# Patient Record
Sex: FEMALE | Race: WHITE | NOT HISPANIC OR LATINO | Employment: FULL TIME | URBAN - METROPOLITAN AREA
[De-identification: names, ages, dates, MRNs, and addresses within clinical notes are randomized per-mention and may not be internally consistent; named-entity substitution may affect disease eponyms.]

---

## 2019-12-12 ENCOUNTER — APPOINTMENT (OUTPATIENT)
Dept: WOMENS IMAGING | Facility: HOSPITAL | Age: 49
End: 2019-12-12

## 2019-12-12 PROCEDURE — 77063 BREAST TOMOSYNTHESIS BI: CPT | Performed by: RADIOLOGY

## 2019-12-12 PROCEDURE — 77067 SCR MAMMO BI INCL CAD: CPT | Performed by: RADIOLOGY

## 2019-12-19 ENCOUNTER — APPOINTMENT (OUTPATIENT)
Dept: WOMENS IMAGING | Facility: HOSPITAL | Age: 49
End: 2019-12-19

## 2019-12-19 PROCEDURE — 76641 ULTRASOUND BREAST COMPLETE: CPT | Performed by: RADIOLOGY

## 2020-07-10 ENCOUNTER — APPOINTMENT (OUTPATIENT)
Dept: WOMENS IMAGING | Facility: HOSPITAL | Age: 50
End: 2020-07-10

## 2020-07-10 PROCEDURE — 76641 ULTRASOUND BREAST COMPLETE: CPT | Performed by: RADIOLOGY

## 2021-06-02 ENCOUNTER — APPOINTMENT (OUTPATIENT)
Dept: WOMENS IMAGING | Facility: HOSPITAL | Age: 51
End: 2021-06-02

## 2021-06-02 PROCEDURE — 76641 ULTRASOUND BREAST COMPLETE: CPT | Performed by: RADIOLOGY

## 2021-06-02 PROCEDURE — 77062 BREAST TOMOSYNTHESIS BI: CPT | Performed by: RADIOLOGY

## 2021-06-02 PROCEDURE — 77066 DX MAMMO INCL CAD BI: CPT | Performed by: RADIOLOGY

## 2021-06-02 PROCEDURE — G0279 TOMOSYNTHESIS, MAMMO: HCPCS | Performed by: RADIOLOGY

## 2021-12-03 ENCOUNTER — APPOINTMENT (OUTPATIENT)
Dept: WOMENS IMAGING | Facility: HOSPITAL | Age: 51
End: 2021-12-03

## 2021-12-03 PROCEDURE — 76642 ULTRASOUND BREAST LIMITED: CPT | Performed by: RADIOLOGY

## 2022-05-05 ENCOUNTER — APPOINTMENT (OUTPATIENT)
Dept: WOMENS IMAGING | Facility: HOSPITAL | Age: 52
End: 2022-05-05

## 2022-05-05 PROCEDURE — 77067 SCR MAMMO BI INCL CAD: CPT | Performed by: RADIOLOGY

## 2022-05-05 PROCEDURE — 77063 BREAST TOMOSYNTHESIS BI: CPT | Performed by: RADIOLOGY

## 2022-08-08 ENCOUNTER — PREP FOR SURGERY (OUTPATIENT)
Dept: OTHER | Facility: HOSPITAL | Age: 52
End: 2022-08-08

## 2022-08-08 DIAGNOSIS — Z12.11 SCREENING FOR COLON CANCER: Primary | ICD-10-CM

## 2022-08-10 PROBLEM — Z12.11 SCREENING FOR COLON CANCER: Status: ACTIVE | Noted: 2022-08-10

## 2022-08-24 RX ORDER — CETIRIZINE HYDROCHLORIDE 10 MG/1
10 TABLET ORAL DAILY
COMMUNITY

## 2022-08-25 ENCOUNTER — HOSPITAL ENCOUNTER (OUTPATIENT)
Facility: HOSPITAL | Age: 52
Setting detail: HOSPITAL OUTPATIENT SURGERY
Discharge: HOME | End: 2022-08-25
Attending: COLON & RECTAL SURGERY | Admitting: COLON & RECTAL SURGERY

## 2022-08-25 ENCOUNTER — ANESTHESIA EVENT (OUTPATIENT)
Dept: GASTROENTEROLOGY | Facility: HOSPITAL | Age: 52
End: 2022-08-25

## 2022-08-25 ENCOUNTER — ANESTHESIA (OUTPATIENT)
Dept: GASTROENTEROLOGY | Facility: HOSPITAL | Age: 52
End: 2022-08-25

## 2022-08-25 VITALS
WEIGHT: 163 LBS | HEIGHT: 65 IN | RESPIRATION RATE: 16 BRPM | OXYGEN SATURATION: 96 % | SYSTOLIC BLOOD PRESSURE: 112 MMHG | TEMPERATURE: 98 F | DIASTOLIC BLOOD PRESSURE: 82 MMHG | BODY MASS INDEX: 27.16 KG/M2 | HEART RATE: 60 BPM

## 2022-08-25 DIAGNOSIS — Z12.11 SCREENING FOR COLON CANCER: ICD-10-CM

## 2022-08-25 PROCEDURE — 88305 TISSUE EXAM BY PATHOLOGIST: CPT | Performed by: COLON & RECTAL SURGERY

## 2022-08-25 PROCEDURE — 45380 COLONOSCOPY AND BIOPSY: CPT | Performed by: COLON & RECTAL SURGERY

## 2022-08-25 PROCEDURE — 25010000002 PROPOFOL 10 MG/ML EMULSION: Performed by: ANESTHESIOLOGY

## 2022-08-25 RX ORDER — SODIUM CHLORIDE, SODIUM LACTATE, POTASSIUM CHLORIDE, CALCIUM CHLORIDE 600; 310; 30; 20 MG/100ML; MG/100ML; MG/100ML; MG/100ML
30 INJECTION, SOLUTION INTRAVENOUS CONTINUOUS PRN
Status: DISCONTINUED | OUTPATIENT
Start: 2022-08-25 | End: 2022-08-25 | Stop reason: HOSPADM

## 2022-08-25 RX ORDER — PROPOFOL 10 MG/ML
VIAL (ML) INTRAVENOUS AS NEEDED
Status: DISCONTINUED | OUTPATIENT
Start: 2022-08-25 | End: 2022-08-25 | Stop reason: SURG

## 2022-08-25 RX ORDER — SODIUM CHLORIDE 0.9 % (FLUSH) 0.9 %
10 SYRINGE (ML) INJECTION AS NEEDED
Status: DISCONTINUED | OUTPATIENT
Start: 2022-08-25 | End: 2022-08-25 | Stop reason: HOSPADM

## 2022-08-25 RX ORDER — PROPOFOL 10 MG/ML
VIAL (ML) INTRAVENOUS CONTINUOUS PRN
Status: DISCONTINUED | OUTPATIENT
Start: 2022-08-25 | End: 2022-08-25 | Stop reason: SURG

## 2022-08-25 RX ORDER — SODIUM CHLORIDE 0.9 % (FLUSH) 0.9 %
10 SYRINGE (ML) INJECTION EVERY 12 HOURS SCHEDULED
Status: DISCONTINUED | OUTPATIENT
Start: 2022-08-25 | End: 2022-08-25 | Stop reason: HOSPADM

## 2022-08-25 RX ORDER — LIDOCAINE HYDROCHLORIDE 20 MG/ML
INJECTION, SOLUTION INFILTRATION; PERINEURAL AS NEEDED
Status: DISCONTINUED | OUTPATIENT
Start: 2022-08-25 | End: 2022-08-25 | Stop reason: SURG

## 2022-08-25 RX ADMIN — LIDOCAINE HYDROCHLORIDE 60 MG: 20 INJECTION, SOLUTION INFILTRATION; PERINEURAL at 13:10

## 2022-08-25 RX ADMIN — Medication 160 MCG/KG/MIN: at 13:10

## 2022-08-25 RX ADMIN — SODIUM CHLORIDE, POTASSIUM CHLORIDE, SODIUM LACTATE AND CALCIUM CHLORIDE 30 ML/HR: 600; 310; 30; 20 INJECTION, SOLUTION INTRAVENOUS at 12:35

## 2022-08-25 RX ADMIN — PROPOFOL 120 MG: 10 INJECTION, EMULSION INTRAVENOUS at 13:10

## 2022-08-25 NOTE — H&P
Twila Elam is a 52 y.o. female  who is referred by Marilyn Roberts MD for a colonoscopy. She   has an indications: screening for colon cancer.     She denies any change in bowel function, melena, or hematochezia.    Past Medical History:   Diagnosis Date   • GERD (gastroesophageal reflux disease)    • Seasonal allergies    • Shingles 2004       Past Surgical History:   Procedure Laterality Date   • APPENDECTOMY  2017   • BREAST LUMPECTOMY Right 2004       Medications Prior to Admission   Medication Sig Dispense Refill Last Dose   • cetirizine (zyrTEC) 10 MG tablet Take 10 mg by mouth Daily.   8/24/2022 at Unknown time       No Known Allergies    Family History   Problem Relation Age of Onset   • Stroke Paternal Grandfather    • Malig Hyperthermia Neg Hx        Social History     Socioeconomic History   • Marital status:    Tobacco Use   • Smoking status: Never Smoker   • Smokeless tobacco: Never Used   Vaping Use   • Vaping Use: Never used   Substance and Sexual Activity   • Alcohol use: No   • Drug use: Defer   • Sexual activity: Defer       Review of Systems   Gastrointestinal: Negative for abdominal pain, nausea and vomiting.   All other systems reviewed and are negative.      Vitals:    08/25/22 1223   BP: 119/84   Pulse: 71   Resp: 18   Temp: 98 °F (36.7 °C)   SpO2: 96%         Physical Exam  Constitutional:       Appearance: She is well-developed.   HENT:      Head: Normocephalic and atraumatic.   Eyes:      Pupils: Pupils are equal, round, and reactive to light.   Cardiovascular:      Rate and Rhythm: Regular rhythm.   Pulmonary:      Effort: Pulmonary effort is normal.   Abdominal:      General: There is no distension.      Palpations: Abdomen is soft.   Musculoskeletal:         General: Normal range of motion.   Skin:     General: Skin is warm and dry.   Neurological:      Mental Status: She is alert and oriented to person, place, and time.   Psychiatric:         Thought Content: Thought  content normal.         Judgment: Judgment normal.           Assessment & Plan      indications: screening for colon cancer         I recommend colonoscopy.  I described risks, benefits of the procedure with the patient including but not limited to bleeding, infection, possibility of perforation and possible polypectomy. All of the patient's questions were answered and they would like to proceed with the above recommendations.

## 2022-08-25 NOTE — DISCHARGE INSTRUCTIONS

## 2022-08-25 NOTE — ANESTHESIA PREPROCEDURE EVALUATION
Anesthesia Evaluation     Patient summary reviewed and Nursing notes reviewed   no history of anesthetic complications:  NPO Solid Status: > 6 hours  NPO Liquid Status: > 6 hours           Airway   Mallampati: II  TM distance: >3 FB  Neck ROM: full  no difficulty expected and No difficulty expected  Dental - normal exam     Pulmonary - negative pulmonary ROS and normal exam    breath sounds clear to auscultation  (-) rhonchi, decreased breath sounds, wheezes, rales, stridor  Cardiovascular - negative cardio ROS and normal exam    NYHA Classification: I  Rhythm: regular  Rate: normal    (-) murmur, weak pulses, friction rub, systolic click, carotid bruits, JVD, peripheral edema      Neuro/Psych- negative ROS  GI/Hepatic/Renal/Endo    (+)  GERD,      Musculoskeletal (-) negative ROS    Abdominal  - normal exam    Abdomen: soft.   Substance History - negative use     OB/GYN negative ob/gyn ROS         Other - negative ROS                       Anesthesia Plan    ASA 1     MAC     intravenous induction     Anesthetic plan, risks, benefits, and alternatives have been provided, discussed and informed consent has been obtained with: patient.        CODE STATUS:

## 2022-08-25 NOTE — ANESTHESIA POSTPROCEDURE EVALUATION
"Patient: Twila Elam    Procedure Summary     Date: 08/25/22 Room / Location:  [facility]    Anesthesia Start: 1256 Anesthesia Stop: 1328    Procedure: COLONOSCOPY into cecum with polypectomy (N/A ) Diagnosis:       Screening for colon cancer      (Screening for colon cancer [Z12.11])    Surgeons: Marilyn Roberts MD Provider: Austin Robles MD    Anesthesia Type: MAC ASA Status: 1          Anesthesia Type: MAC    Vitals  No vitals data found for the desired time range.          Post Anesthesia Care and Evaluation    Patient location during evaluation: bedside  Patient participation: complete - patient participated  Level of consciousness: sleepy but conscious  Pain management: adequate    Airway patency: patent  Anesthetic complications: No anesthetic complications    Cardiovascular status: acceptable  Respiratory status: acceptable  Hydration status: acceptable    Comments: */84 (BP Location: Left arm, Patient Position: Sitting)   Pulse 71   Temp 36.7 °C (98 °F) (Oral)   Resp 18   Ht 165.1 cm (65\")   Wt 73.9 kg (163 lb)   LMP 08/27/2017   SpO2 96%   BMI 27.12 kg/m²         "

## 2022-08-26 LAB
LAB AP CASE REPORT: NORMAL
LAB AP CLINICAL INFORMATION: NORMAL
PATH REPORT.FINAL DX SPEC: NORMAL
PATH REPORT.GROSS SPEC: NORMAL

## 2024-01-26 ENCOUNTER — OFFICE VISIT (OUTPATIENT)
Dept: FAMILY MEDICINE CLINIC | Facility: CLINIC | Age: 54
End: 2024-01-26
Payer: COMMERCIAL

## 2024-01-26 VITALS
BODY MASS INDEX: 28.32 KG/M2 | SYSTOLIC BLOOD PRESSURE: 128 MMHG | HEART RATE: 83 BPM | HEIGHT: 65 IN | WEIGHT: 170 LBS | DIASTOLIC BLOOD PRESSURE: 60 MMHG | OXYGEN SATURATION: 98 %

## 2024-01-26 DIAGNOSIS — L85.3 DRY SKIN: ICD-10-CM

## 2024-01-26 DIAGNOSIS — Z11.59 NEED FOR HEPATITIS C SCREENING TEST: ICD-10-CM

## 2024-01-26 DIAGNOSIS — Z23 ENCOUNTER FOR IMMUNIZATION: ICD-10-CM

## 2024-01-26 DIAGNOSIS — K21.9 GASTROESOPHAGEAL REFLUX DISEASE, UNSPECIFIED WHETHER ESOPHAGITIS PRESENT: Primary | ICD-10-CM

## 2024-01-26 DIAGNOSIS — Z00.00 ANNUAL PHYSICAL EXAM: ICD-10-CM

## 2024-01-26 PROBLEM — Z86.010 HISTORY OF COLON POLYPS: Status: ACTIVE | Noted: 2024-01-26

## 2024-01-26 PROBLEM — R92.30 DENSE BREAST TISSUE: Status: ACTIVE | Noted: 2024-01-26

## 2024-01-26 PROBLEM — Z12.11 SCREENING FOR COLON CANCER: Status: RESOLVED | Noted: 2022-08-10 | Resolved: 2024-01-26

## 2024-01-26 PROBLEM — R12 HEARTBURN: Status: ACTIVE | Noted: 2024-01-26

## 2024-01-26 PROBLEM — Z80.0 FAMILY HISTORY OF COLON CANCER: Status: ACTIVE | Noted: 2024-01-26

## 2024-01-26 PROBLEM — Z90.49 S/P LAPAROSCOPIC APPENDECTOMY: Status: ACTIVE | Noted: 2017-09-17

## 2024-01-26 PROBLEM — K35.33 ACUTE APPENDICITIS WITH PERFORATION AND PERITONEAL ABSCESS: Status: RESOLVED | Noted: 2017-09-17 | Resolved: 2024-01-26

## 2024-01-26 PROBLEM — Z86.0100 HISTORY OF COLON POLYPS: Status: ACTIVE | Noted: 2024-01-26

## 2024-01-26 RX ORDER — METHYLPREDNISOLONE 4 MG/1
TABLET ORAL
COMMUNITY
Start: 2023-12-12 | End: 2024-01-26

## 2024-01-26 RX ORDER — AMOXICILLIN AND CLAVULANATE POTASSIUM 875; 125 MG/1; MG/1
1 TABLET, FILM COATED ORAL EVERY 12 HOURS SCHEDULED
COMMUNITY
Start: 2023-12-09 | End: 2024-01-26

## 2024-01-26 RX ORDER — CLINDAMYCIN HYDROCHLORIDE 300 MG/1
1 CAPSULE ORAL 3 TIMES DAILY
COMMUNITY
Start: 2023-12-12 | End: 2024-01-26

## 2024-01-26 NOTE — PATIENT INSTRUCTIONS
Today: Update screening labs and vaccinations.    Meds: None    Referrals: None    Imaging: Mammo in May    Healthy lifestyle tips  - Reduce intake of processed food (shop perimeter of grocery store), especially white flour and sugar  - Increase intake of fruits/veggies  - Drink 32-64oz of water a day  - Quit smoking if you smoke  - Drink no more than 2 alcoholic beverages/day if you are male, no more than 1 alcoholic beverage/day if you are female  - Get 6-8 hours of restful sleep at night- poor sleep quality has been linked to increased risk of cardiovascular disease  - Voluntary physical activity cumulative 120-150 minutes/week  - Stress management utilizing meditation and mindfulness ([employer], free kenia)  - Keep blood pressure < 140/90    Heart healthy diet from AHA: https://www.heart.org/en/healthy-living/healthy-eating/eat-smart/nutrition-basics/aha-diet-and-lifestyle-recommendations    Skin care plan  - apply unscented eczema lotion 2x daily and within 5 minutes of getting out of a shower (use only lotion in a tube or jar; anything with a pump will have alcohol in it to thin the lotion). Recommended brands: Vanicream, cereve, eucerin, cetaphil  - if hands/feet becomes extremely dry/cracked recommend applying aquaphor or petroleum jelly before bed and covering hands/feet with sock while sleeping  - use scent-free/dye free bath and laundry products  - use humidifier at home and especially at night  - drink at least 32oz of water daily    HEARTBURN PLAN:  Lifestyle changes: caffeine reduction, dietary changes, elevate head of bed, do not eat after supper  Meds: OTC meds  Imaging: not indicated  Referrals: no  Red flags: trouble swallowing, unintentional weight loss, tarry/black bowel movements, blood in bowel movements, blood in vomit, abdominal/chest pain that does not go away, abdominal/chest pain worse after eating

## 2024-01-26 NOTE — PROGRESS NOTES
"Chief Complaint  Chief Complaint   Patient presents with    [facility] Care    Heartburn       Subjective    History of Present Illness  Twila Elam is a 54 y.o. female presents to [facility] to establish care.  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 Feels rested when she wake sup in the morning, she does not snore.   Mood: Pretty good- no concerns  Health goals: She just turned 54 and is working on improving her health and maintaining mobility.  She has not had a PCP for years because she generally does not get sick.   6 weeks ago she had a red spot on her chin, has had folliculitis before and thought it was that. Spot started getting bigger and there was some facial swelling. [place] [facility] [place] augmentin. Within 2 days the swelling worsened to where she could not eat and the skin was turning purple. Went to ER and the wait was 6 hours at 6pm, so went back [place] [facility] [place] put on clindamycin and a steroid and it improved almost immediately. She was seen a 3rd time just before the holiday weekend, there was a subcutaneous ropy nodule in the cheek, advised to continue watching and waiting and it improved with time.        Current Outpatient Medications on File Prior to Visit   Medication Sig Dispense Refill    cetirizine (zyrTEC) 10 MG tablet Take 1 tablet by mouth Daily.      [DISCONTINUED] " amoxicillin-clavulanate (AUGMENTIN) 875-125 MG per tablet Take 1 tablet by mouth Every 12 (Twelve) Hours. (Patient not taking: Reported on 1/26/2024)      [DISCONTINUED] clindamycin (CLEOCIN) 300 MG capsule Take 1 capsule by mouth 3 times a day. (Patient not taking: Reported on 1/26/2024)      [DISCONTINUED] methylPREDNISolone (MEDROL) 4 MG dose pack take by mouth as directed on inside of package (Patient not taking: Reported on 1/26/2024)       No current facility-administered medications on file prior to visit.       Patient Active Problem List   Diagnosis    S/P laparoscopic appendectomy    Family history of colon cancer    History of colon polyps    Dense breast tissue    GERD (gastroesophageal reflux disease)    Dry skin       Past Medical History:   Diagnosis Date    Acute appendicitis with perforation and peritoneal abscess 09/17/2017    Colon polyps     FOLLOWED BY DR. GRICELDA BROWN    GERD (gastroesophageal reflux disease)     Headache Lifelong    Mostly weather related    Screening for colon cancer     Added automatically from request for surgery [id number]    Seasonal allergies     Shingles 2004       Past Surgical History:   Procedure Laterality Date    APPENDECTOMY  2017    BREAST LUMPECTOMY Right 2004    COLONOSCOPY N/A 08/25/2022    4 MM BENIGN POLYP IN RECTUM, RESCOPE IN 5 YRS, DR. GRICELDA BROWN AT [facility]       Family History   Problem Relation Age of Onset    Colon cancer Mother         Early colon cancer 2021    Thyroid disease Mother         Hypothyroidism    Hyperlipidemia Mother     Other Father         Diagnosed with MS (and possibly MD) around 2010; passed from covid 2021    Thyroid cancer Daughter 13    Hypothyroidism Maternal Aunt     Stroke Paternal Grandfather     Malig Hyperthermia Neg Hx        Health Maintenance Summary            Ordered - HEPATITIS C SCREENING (Once) Ordered on 1/26/2024      No completion, postpone, or frequency change history exists for this topic.              Postponed -  "BMI FOLLOWUP (Yearly) Postponed until 1/27/2024 01/26/2024  Postponed until 1/27/2024 by Theresa Parra MD (Pending event)              Postponed - MAMMOGRAM (Every 2 Years) Postponed until 5/1/2024 01/26/2024  Postponed until 5/1/2024 by Theresa Parra MD (Pending event - scheduled)              Postponed - INFLUENZA VACCINE (Yearly - August to March) Postponed until 12/27/2024 01/26/2024  Postponed until 12/27/2024 by Theresa Parra MD (Patient Refused)              Postponed - ZOSTER VACCINE (1 of 2) Postponed until 1/26/2025 01/26/2024  Postponed until 1/26/2025 by Theresa Parra MD (Pending event)              ANNUAL PHYSICAL (Yearly) Next due on 1/26/2025 01/26/2024  Done              PAP SMEAR (Every 3 Years) Next due on 5/1/2026 05/01/2023  Patient-Reported (Performed Externally)              COLORECTAL CANCER SCREENING (COLONOSCOPY - Every 10 Years) Next due on 8/25/2032 08/25/2022  COLONOSCOPY    08/25/2022  Surgical Procedure: COLONOSCOPY              TDAP/TD VACCINES (2 - Td or Tdap) Next due on 1/26/2034 01/26/2024  Imm Admin: Tdap              COVID-19 Vaccine (Series Information) Completed      10/31/2023  Imm Admin: COVID-19 F23 (MODERNA) 12YRS+ (SPIKEVAX)    01/06/2023  Imm Admin: COVID-19 (PFIZER) BIVALENT 12+YRS    06/03/2022  Imm Admin: COVID-19 (MODERNA) Monovalent Original Booster    11/19/2021  Imm Admin: COVID-19 (MODERNA) 1st,2nd,3rd Dose Monovalent    04/28/2021  Imm Admin: COVID-19 (MODERNA) 1st,2nd,3rd Dose Monovalent    Only the first 5 history entries have been loaded, but more history exists.              Pneumococcal Vaccine 0-64 (Series Information) Aged Out      No completion, postpone, or frequency change history exists for this topic.                       Objective   Vitals:    01/26/24 1050   BP: 128/60   Pulse: 83   SpO2: 98%   Weight: 77.1 kg (170 lb)   Height: 165.1 cm (65\")        Physical Exam  Vitals reviewed.   Constitutional:       " General: She is not in acute distress.     Appearance: Normal appearance.   HENT:      Head: Normocephalic and atraumatic.      Right Ear: Tympanic membrane, ear canal and external ear normal.      Left Ear: Tympanic membrane, ear canal and external ear normal.      Nose: Nose normal. No rhinorrhea.      Mouth/Throat:      Mouth: Mucous membranes are moist.      Pharynx: No posterior oropharyngeal erythema.   Eyes:      Extraocular Movements: Extraocular movements intact.      Conjunctiva/sclera: Conjunctivae normal.      Pupils: Pupils are equal, round, and reactive to light.   Neck:      Comments: No thyromegaly or thyroid nodule on palpation  Cardiovascular:      Rate and Rhythm: Normal rate and regular rhythm.      Pulses: Normal pulses.      Heart sounds: Normal heart sounds. No murmur heard.  Pulmonary:      Effort: Pulmonary effort is normal.      Breath sounds: Normal breath sounds. No wheezing.   Abdominal:      General: Bowel sounds are normal. There is no distension.      Palpations: Abdomen is soft.      Tenderness: There is no abdominal tenderness.   Musculoskeletal:         General: No tenderness or deformity. Normal range of motion.      Cervical back: Normal range of motion and neck supple.   Lymphadenopathy:      Cervical: No cervical adenopathy.   Skin:     General: Skin is warm and dry.      Capillary Refill: Capillary refill takes less than 2 seconds.      Findings: No lesion or rash.      Comments: Dry, cracking skin on thumb  0.5cm ovoid seborrheic keratosis on L thoracic back   Neurological:      General: No focal deficit present.      Mental Status: She is alert and oriented to person, place, and time.      Gait: Gait normal.      Deep Tendon Reflexes: Reflexes normal.   Psychiatric:         Mood and Affect: Mood normal.         Behavior: Behavior normal.         Judgment: Judgment normal.         PHQ-9 Depression Screening  Little interest or pleasure in doing things? 0-->not at all    Feeling down, depressed, or hopeless? 0-->not at all   Trouble falling or staying asleep, or sleeping too much?     Feeling tired or having little energy?     Poor appetite or overeating?     Feeling bad about yourself - or that you are a failure or have let yourself or your family down?     Trouble concentrating on things, such as reading the newspaper or watching television?     Moving or speaking so slowly that other people could have noticed? Or the opposite - being so fidgety or restless that you have been moving around a lot more than usual?     Thoughts that you would be better off dead, or of hurting yourself in some way?     PHQ-9 Total Score 0   If you checked off any problems, how difficult have these problems made it for you to do your work, take care of things at home, or get along with other people?       The following data was reviewed by: Theresa Parra MD on 01/26/2024:    Consultant notes C note x 3 and GI studies colonoscopy, biopsy result      Assessment and Plan  Twila Elam is a 54 y.o. female presents to [facility] today to establish care, chart reviewed and updated, medications reviewed, labs reviewed, preventative med reviewed. Patient has not been seen by primary care for annual exam in the last 12 months.. Answered all questions at this time, pt expressed no further concerns today.     Preventative medicine:   Eye exam: Not up to date.  Encouraged annual dental exam.   Dental exam: Up to date.    BP: normal  Lipid Panel :   ordered    A1c:  ordered    Hep C screening: ordered  HIV Screen UTD - N/A  STI screening UTD: N/A  Colon cancer screening UTD: Yes  Lung cancer screening UTD: N/A  Immunizations UTD: No- pt declined flu shot  Anxiety/depression screening: normal  Tobacco cessation counseling: N/A    Women:   Pap: Up to date.  normal  Mammogram: Up to date.  normal  Osteoporosis Screen:  age 65      Diagnoses and all orders for this visit:    1.  Gastroesophageal reflux disease, unspecified whether esophagitis present (Primary)  Assessment & Plan:  History and exam reassuring no emergent etiology likely. Diet controlled and PRN OTC meds. Shared decision making with patient.   - AVS with suggestions for GERD  - symptom relief with OTC meds  - RTC if no improvement or change in symptoms      Orders:  -     CBC & Differential  -     Comprehensive Metabolic Panel  -     Lipid Panel  -     Hemoglobin A1c    2. Dry skin  Assessment & Plan:  Dry skin with some cracking on thumb.  - apply unscented eczema lotion 2x daily and within 5 minutes of getting out of a shower (use only lotion in a tube or jar; anything with a pump will have alcohol in it to thin the lotion). Recommended brands: Vanicream, cereve, eucerin, cetaphil  - if hands/feet becomes extremely dry/cracked recommend applying aquaphor or petroleum jelly before bed and covering hands/feet with sock while sleeping  - use scent-free/dye free bath and laundry products  - use humidifier at home and especially at night  - drink at least 32oz of water daily  - if no improvement, can consider topical steroid        3. Need for hepatitis C screening test  -     Hepatitis C Antibody    4. Annual physical exam  -     CBC & Differential  -     Comprehensive Metabolic Panel  -     Lipid Panel  -     Hemoglobin A1c    5. Encounter for immunization  -     Tdap Vaccine Greater Than or Equal To 6yo IM  -     Shingrix Vaccine        Patient voiced understanding and agreement with plan of care and had no further questions or concerns at this time.     I spent 31 minutes on this encounter, including chart review of any relevant previous encounters, labs, and imaging;  >%50% of encounter spent face-to-face with the patient or coordinating care.    Theresa Parra MD  Family Medicine  [facility]    Follow Up  Return if symptoms worsen or fail to improve.    Patient Instructions   Today: Update screening labs  and vaccinations.    Meds: None    Referrals: None    Imaging: Mammo in May    Healthy lifestyle tips  - Reduce intake of processed food (shop perimeter of grocery store), especially white flour and sugar  - Increase intake of fruits/veggies  - Drink 32-64oz of water a day  - Quit smoking if you smoke  - Drink no more than 2 alcoholic beverages/day if you are male, no more than 1 alcoholic beverage/day if you are female  - Get 6-8 hours of restful sleep at night- poor sleep quality has been linked to increased risk of cardiovascular disease  - Voluntary physical activity cumulative 120-150 minutes/week  - Stress management utilizing meditation and mindfulness ([employer], free kenia)  - Keep blood pressure < 140/90    Heart healthy diet from AHA: https://www.heart.org/en/healthy-living/healthy-eating/eat-smart/nutrition-basics/aha-diet-and-lifestyle-recommendations    Skin care plan  - apply unscented eczema lotion 2x daily and within 5 minutes of getting out of a shower (use only lotion in a tube or jar; anything with a pump will have alcohol in it to thin the lotion). Recommended brands: Vanicream, cereve, eucerin, cetaphil  - if hands/feet becomes extremely dry/cracked recommend applying aquaphor or petroleum jelly before bed and covering hands/feet with sock while sleeping  - use scent-free/dye free bath and laundry products  - use humidifier at home and especially at night  - drink at least 32oz of water daily    HEARTBURN PLAN:  Lifestyle changes: caffeine reduction, dietary changes, elevate head of bed, do not eat after supper  Meds: OTC meds  Imaging: not indicated  Referrals: no  Red flags: trouble swallowing, unintentional weight loss, tarry/black bowel movements, blood in bowel movements, blood in vomit, abdominal/chest pain that does not go away, abdominal/chest pain worse after eating

## 2024-01-26 NOTE — ASSESSMENT & PLAN NOTE
History and exam reassuring no emergent etiology likely. Diet controlled and PRN OTC meds. Shared decision making with patient.   - AVS with suggestions for GERD  - symptom relief with OTC meds  - RTC if no improvement or change in symptoms

## 2024-01-26 NOTE — ASSESSMENT & PLAN NOTE
Dry skin with some cracking on thumb.  - apply unscented eczema lotion 2x daily and within 5 minutes of getting out of a shower (use only lotion in a tube or jar; anything with a pump will have alcohol in it to thin the lotion). Recommended brands: Vanicream, cereve, eucerin, cetaphil  - if hands/feet becomes extremely dry/cracked recommend applying aquaphor or petroleum jelly before bed and covering hands/feet with sock while sleeping  - use scent-free/dye free bath and laundry products  - use humidifier at home and especially at night  - drink at least 32oz of water daily  - if no improvement, can consider topical steroid

## 2024-01-27 LAB
ALBUMIN SERPL-MCNC: 4.7 G/DL (ref 3.5–5.2)
ALBUMIN/GLOB SERPL: 2 G/DL
ALP SERPL-CCNC: 70 U/L (ref 39–117)
ALT SERPL-CCNC: 42 U/L (ref 1–33)
AST SERPL-CCNC: 34 U/L (ref 1–32)
BASOPHILS # BLD AUTO: 0.05 10*3/MM3 (ref 0–0.2)
BASOPHILS NFR BLD AUTO: 1 % (ref 0–1.5)
BILIRUB SERPL-MCNC: 0.7 MG/DL (ref 0–1.2)
BUN SERPL-MCNC: 12 MG/DL (ref 6–20)
BUN/CREAT SERPL: 14.3 (ref 7–25)
CALCIUM SERPL-MCNC: 10.1 MG/DL (ref 8.6–10.5)
CHLORIDE SERPL-SCNC: 101 MMOL/L (ref 98–107)
CHOLEST SERPL-MCNC: 242 MG/DL (ref 0–200)
CO2 SERPL-SCNC: 23.4 MMOL/L (ref 22–29)
CREAT SERPL-MCNC: 0.84 MG/DL (ref 0.57–1)
EGFRCR SERPLBLD CKD-EPI 2021: 82.7 ML/MIN/1.73
EOSINOPHIL # BLD AUTO: 0.13 10*3/MM3 (ref 0–0.4)
EOSINOPHIL NFR BLD AUTO: 2.6 % (ref 0.3–6.2)
ERYTHROCYTE [DISTWIDTH] IN BLOOD BY AUTOMATED COUNT: 13.3 % (ref 12.3–15.4)
GLOBULIN SER CALC-MCNC: 2.4 GM/DL
GLUCOSE SERPL-MCNC: 89 MG/DL (ref 65–99)
HBA1C MFR BLD: 6.1 % (ref 4.8–5.6)
HCT VFR BLD AUTO: 43.8 % (ref 34–46.6)
HCV IGG SERPL QL IA: NON REACTIVE
HDLC SERPL-MCNC: 60 MG/DL (ref 40–60)
HGB BLD-MCNC: 14.4 G/DL (ref 12–15.9)
IMM GRANULOCYTES # BLD AUTO: 0.01 10*3/MM3 (ref 0–0.05)
IMM GRANULOCYTES NFR BLD AUTO: 0.2 % (ref 0–0.5)
LDLC SERPL CALC-MCNC: 156 MG/DL (ref 0–100)
LYMPHOCYTES # BLD AUTO: 1.29 10*3/MM3 (ref 0.7–3.1)
LYMPHOCYTES NFR BLD AUTO: 25.9 % (ref 19.6–45.3)
MCH RBC QN AUTO: 29.4 PG (ref 26.6–33)
MCHC RBC AUTO-ENTMCNC: 32.9 G/DL (ref 31.5–35.7)
MCV RBC AUTO: 89.6 FL (ref 79–97)
MONOCYTES # BLD AUTO: 0.41 10*3/MM3 (ref 0.1–0.9)
MONOCYTES NFR BLD AUTO: 8.2 % (ref 5–12)
NEUTROPHILS # BLD AUTO: 3.1 10*3/MM3 (ref 1.7–7)
NEUTROPHILS NFR BLD AUTO: 62.1 % (ref 42.7–76)
NRBC BLD AUTO-RTO: 0 /100 WBC (ref 0–0.2)
PLATELET # BLD AUTO: 335 10*3/MM3 (ref 140–450)
POTASSIUM SERPL-SCNC: 4.8 MMOL/L (ref 3.5–5.2)
PROT SERPL-MCNC: 7.1 G/DL (ref 6–8.5)
RBC # BLD AUTO: 4.89 10*6/MM3 (ref 3.77–5.28)
SODIUM SERPL-SCNC: 138 MMOL/L (ref 136–145)
TRIGL SERPL-MCNC: 145 MG/DL (ref 0–150)
VLDLC SERPL CALC-MCNC: 26 MG/DL (ref 5–40)
WBC # BLD AUTO: 4.99 10*3/MM3 (ref 3.4–10.8)

## 2024-01-29 DIAGNOSIS — E78.2 MODERATE MIXED HYPERLIPIDEMIA NOT REQUIRING STATIN THERAPY: Primary | ICD-10-CM

## 2024-01-29 DIAGNOSIS — R73.03 PREDIABETES: ICD-10-CM

## 2024-01-29 DIAGNOSIS — R79.89 ABNORMAL LFTS: ICD-10-CM

## 2024-01-29 NOTE — PROGRESS NOTES
Hello!    Here are the results of your most recent labs:    Your Hep C antibody, CBC, and Kidney Profile was all normal.     Your Cholesterol, Hgb A1C, and Liver Panel was abnormal.     Your cholesterol was elevated.  For diet and lifestyle intervention, I recommend decreasing intake of trans and saturated fats, and red meat.  Increase physical activity as tolerated.  You may try supplementing with omega-3 1-2g daily, berberine 500mg daily, and/or whole flaxseed if desired.    ASCVD risk: The 10-year ASCVD risk score (Lilibeth MONIQUE, et al., 2019) is: 2%    Values used to calculate the score:      Age: 54 years      Sex: Female      Is Non- : No      Diabetic: No      Tobacco smoker: No      Systolic Blood Pressure: 128 mmHg      Is BP treated: No      HDL Cholesterol: 60 mg/dL      Total Cholesterol: 242 mg/dL      At this time a statin is not recommended.     Your A1c was in the prediabetic range; if it goes over 6.5 you will have type 2 diabetes. I recommend decreasing carbohydrate intake to 150-200 g/day, reducing processed food, increasing fruit and vegetable intake, at least 120 minutes of physical activity per week as tolerated, and controlling blood pressure with a goal of less than 120/80. We can start a medication called Metformin to help control your blood sugar, let me know if you would like that ordered.     Repeat labs in 3 months, ordered for our [email or web address] facility ([email or web address] [facility], [place] [address]) by the clinic- please be fasting (no food after midnight, water/meds/black coffee ok).     Please continue your current medications.  Please contact me with any questions.    Thank you!  Dr. Aida Novoa!    Here are the results of your most recent labs:    Your Hep C antibody, CBC, and Kidney Profile was all normal.     Your Cholesterol, Hgb A1C, and Liver Panel was abnormal.     Your cholesterol was elevated.  For diet and lifestyle intervention, I recommend decreasing intake  of trans and saturated fats, and red meat.  Increase physical activity as tolerated.  You may try supplementing with omega-3 1-2g daily, berberine 500mg daily, and/or whole flaxseed if desired.    ASCVD risk: The 10-year ASCVD risk score (Lilibeth MONIQUE, et al., 2019) is: 2%    Values used to calculate the score:      Age: 54 years      Sex: Female      Is Non- : No      Diabetic: No      Tobacco smoker: No      Systolic Blood Pressure: 128 mmHg      Is BP treated: No      HDL Cholesterol: 60 mg/dL      Total Cholesterol: 242 mg/dL      At this time a statin is not recommended.     Your A1c was in the prediabetic range; if it goes over 6.5 you will have type 2 diabetes. I recommend decreasing carbohydrate intake to 150-200 g/day, reducing processed food, increasing fruit and vegetable intake, at least 120 minutes of physical activity per week as tolerated, and controlling blood pressure with a goal of less than 120/80. We can start a medication called Metformin to help control your blood sugar, let me know if you would like that ordered.     Repeat labs in 3 months, ordered for our [facility] facility ([address] [facility], [place] [address]) by the clinic- please be fasting (no food after midnight, water/meds/black coffee ok).     Please continue your current medications.  Please contact me with any questions.    Thank you!  Dr. Parra

## 2024-05-01 ENCOUNTER — PATIENT MESSAGE (OUTPATIENT)
Dept: FAMILY MEDICINE CLINIC | Facility: CLINIC | Age: 54
End: 2024-05-01
Payer: COMMERCIAL

## 2024-05-15 ENCOUNTER — TELEPHONE (OUTPATIENT)
Dept: FAMILY MEDICINE CLINIC | Facility: CLINIC | Age: 54
End: 2024-05-15
Payer: COMMERCIAL

## 2024-09-10 ENCOUNTER — LAB (OUTPATIENT)
Dept: FAMILY MEDICINE CLINIC | Facility: CLINIC | Age: 54
End: 2024-09-10
Payer: COMMERCIAL

## 2024-09-10 DIAGNOSIS — R79.89 ABNORMAL LFTS: ICD-10-CM

## 2024-09-10 DIAGNOSIS — E78.2 MODERATE MIXED HYPERLIPIDEMIA NOT REQUIRING STATIN THERAPY: ICD-10-CM

## 2024-09-10 DIAGNOSIS — R73.03 PREDIABETES: ICD-10-CM

## 2024-09-11 LAB
ALBUMIN SERPL-MCNC: 4.4 G/DL (ref 3.5–5.2)
ALP SERPL-CCNC: 74 U/L (ref 39–117)
ALT SERPL-CCNC: 30 U/L (ref 1–33)
AST SERPL-CCNC: 38 U/L (ref 1–32)
BILIRUB DIRECT SERPL-MCNC: <0.2 MG/DL (ref 0–0.3)
BILIRUB SERPL-MCNC: 0.4 MG/DL (ref 0–1.2)
CHOLEST SERPL-MCNC: 250 MG/DL (ref 0–200)
HBA1C MFR BLD: 5.7 % (ref 4.8–5.6)
HDLC SERPL-MCNC: 61 MG/DL (ref 40–60)
LDLC SERPL CALC-MCNC: 178 MG/DL (ref 0–100)
PROT SERPL-MCNC: 6.7 G/DL (ref 6–8.5)
TRIGL SERPL-MCNC: 68 MG/DL (ref 0–150)
VLDLC SERPL CALC-MCNC: 11 MG/DL (ref 5–40)

## 2024-09-12 NOTE — PROGRESS NOTES
Hello!    Here are the results of your most recent labs:    Your A1c improved-your level went from 6.1 to 5.7, great work!      Your liver function enzymes-1 improved, your ALT.  However your AST worsened, with your level going from 34 a few months ago to 38 most recently. We can discuss further evaluation at your annual exam.    Your cholesterol did not improve, it worsened since it was last checked.  For diet and lifestyle intervention, I recommend decreasing intake of trans and saturated fats, and red meat.  Increase physical activity as tolerated.  You may try supplementing with omega-3 1-2g daily, berberine 500mg daily, and/or whole flaxseed if desired.    ASCVD risk: The 10-year ASCVD risk score (Lilibeth MONIQUE, et al., 2019) is: 2.1%    Values used to calculate the score:      Age: 54 years      Sex: Female      Is Non- : No      Diabetic: No      Tobacco smoker: No      Systolic Blood Pressure: 128 mmHg      Is BP treated: No      HDL Cholesterol: 61 mg/dL      Total Cholesterol: 250 mg/dL     At this time a statin is not recommended.     You are due your mammogram and annual exam by the end of January, please schedule both.  If you already had your mammogram done, please send us documentation so we can update your chart.    Please continue your current medications.  Please contact me with any questions.    Thank you!  Dr. Parra

## 2025-04-11 ENCOUNTER — OFFICE VISIT (OUTPATIENT)
Dept: FAMILY MEDICINE CLINIC | Facility: CLINIC | Age: 55
End: 2025-04-11
Payer: COMMERCIAL

## 2025-04-11 VITALS
BODY MASS INDEX: 26.33 KG/M2 | HEART RATE: 63 BPM | OXYGEN SATURATION: 97 % | WEIGHT: 158 LBS | SYSTOLIC BLOOD PRESSURE: 126 MMHG | DIASTOLIC BLOOD PRESSURE: 76 MMHG | HEIGHT: 65 IN

## 2025-04-11 DIAGNOSIS — Z23 ENCOUNTER FOR IMMUNIZATION: ICD-10-CM

## 2025-04-11 DIAGNOSIS — E78.2 MODERATE MIXED HYPERLIPIDEMIA NOT REQUIRING STATIN THERAPY: Primary | ICD-10-CM

## 2025-04-11 DIAGNOSIS — Z00.00 ANNUAL PHYSICAL EXAM: ICD-10-CM

## 2025-04-11 DIAGNOSIS — R73.03 PREDIABETES: ICD-10-CM

## 2025-04-11 PROBLEM — K21.9 GERD (GASTROESOPHAGEAL REFLUX DISEASE): Status: RESOLVED | Noted: 2024-01-26 | Resolved: 2025-04-11

## 2025-04-11 NOTE — PROGRESS NOTES
Chief Complaint  Chief Complaint   Patient presents with    Annual Exam       Subjective    History of Present Illness  Twila Elam is a 55 y.o. female presents to [facility] for annual exam.  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 Has lost 5lb.  Physical activity: yoga 3-4 days a week, play with dogs- has 3 border collies, an noofie, son's akit and Chinese shepher's puppy.   Support system: , kids  Sleep: Pretty good. Feels rested when she wake sup in the morning, she does not snore.   Mood: Pretty good- no concerns  Health goals:     History of Present Illness  The patient presents for an annual exam.    She has been under the care of a nutritionist since 10/2024, focusing on improving her dietary habits. She has successfully discontinued the use of Tums and allergy medications. Her diet now includes a daily intake of fruits and vegetables, with an emphasis on protein. She has also reduced her sugar consumption, resulting in a weight loss of approximately 5 pounds. She engages in yoga 3 to 4 times a week and has been doing so for nearly a year. She reports no issues with sleep or mood. She is currently not on any prescription medications but takes several supplements. She is sexually active with a male partner who has undergone a vasectomy. She is postmenopausal and reports no vaginal dryness, pain, or bleeding during intercourse. She has had 2 pregnancies and 2 births. She is scheduled for a mammogram in 06/2025. She has dense breast tissue and was previously advised to have mammograms every 6 months, but this has been changed to an annual schedule. She  has seen an eye doctor and dentist within the past year.    She experienced a skin infection last year, which has since resolved. She is currently being treated for a suspected fungal infection, which initially presented as a bacterial infection. The treatment appears to be effective, as the redness and itching have subsided. She has attempted over-the-counter antifungal cortisone treatments, but these exacerbated the itching. She uses a homemade plantain salve for relief when the area becomes itchy. She has also switched to a clean deodorant and maintains good hygiene practices.    She has a history of dry skin, which she manages with regular application of lotion.    She has a history of colon polyps and underwent an appendectomy. She has elevated cholesterol and A1c levels, indicative of prediabetes.    She has a small skin tag on her right side, which does not cause her discomfort.    SOCIAL HISTORY  She does not smoke, drink alcohol, or use illicit drugs. She does not use e-cigarettes or vape.    FAMILY HISTORY  Her mother has a history of breast cancer. There is no family history of high blood pressure or new cancers.      Current Outpatient Medications on File Prior to Visit   Medication Sig Dispense Refill    [DISCONTINUED] cetirizine (zyrTEC) 10 MG tablet Take 1 tablet by mouth Daily.       No current facility-administered medications on file prior to visit.       Patient Active Problem List   Diagnosis    S/P laparoscopic appendectomy    Family history of colon cancer    History of colon polyps    Dense breast tissue    Dry skin    Prediabetes    Moderate mixed hyperlipidemia not requiring statin therapy       Past Medical History:   Diagnosis Date    Acute appendicitis with perforation and peritoneal abscess 09/17/2017    Colon polyps     FOLLOWED BY DR. GRICELDA BROWN    GERD (gastroesophageal reflux disease)     GERD (gastroesophageal reflux disease) 01/26/2024    Headache Lifelong    Mostly weather  related    Screening for colon cancer 08/10/2022    Added automatically from request for surgery [id number]    Seasonal allergies     Shingles 2004       Past Surgical History:   Procedure Laterality Date    APPENDECTOMY  2017    BREAST LUMPECTOMY Right 2004    COLONOSCOPY N/A 08/25/2022    4 MM BENIGN POLYP IN RECTUM, RESCOPE IN 5 YRS, DR. GRICELDA BROWN AT [facility]       Family History   Problem Relation Age of Onset    Colon cancer Mother         Early colon cancer 2021    Thyroid disease Mother         Hypothyroidism    Hyperlipidemia Mother     Breast cancer Mother     Thyroid cancer Daughter 13    Cancer Daughter         Thyroid cancer    Hypothyroidism Maternal Aunt     Stroke Paternal Grandfather     Malig Hyperthermia Neg Hx     Hypertension Neg Hx        Health Maintenance Summary            Awaiting Completion       LIPID PANEL (Yearly) Order placed this encounter      04/11/2025  Order placed for Lipid Panel by Theresa Parra MD    09/10/2024  Lipid panel    01/26/2024  Lipid Panel                      Upcoming       Pneumococcal Vaccine 50+ (1 of 1 - PCV) Postponed until 4/1/2026 04/11/2025  Postponed until 4/1/2026 by Theresa Parra MD (Patient Refused)              INFLUENZA VACCINE (Yearly - July to March) Next due on 7/1/2025      10/04/2024  Imm Admin: Influenza, Unspecified    01/26/2024  Postponed until 12/27/2024 by Theresa Parra MD (Patient Refused)              ANNUAL PHYSICAL (Yearly) Next due on 4/11/2026 04/11/2025  Done    01/26/2024  Done              PAP SMEAR (Every 3 Years) Next due on 5/1/2026 05/01/2023  Patient-Reported (Performed Externally)              MAMMOGRAM (Every 2 Years) Next due on 9/14/2026 09/14/2024  MAMMO Scan    01/26/2024  Postponed until 5/1/2024 by Theresa Parra MD (Pending event - scheduled)              COLORECTAL CANCER SCREENING (COLONOSCOPY - Every 5 Years) Next due on 8/25/2027 04/11/2025  Follow-up changed to COLONOSCOPY - Every 5 Years by  "Theresa Parra MD (Medical Decision - Follow-up Confirmed Manually)    08/25/2022  Surgical Procedure: COLONOSCOPY    08/25/2022  COLONOSCOPY              TDAP/TD VACCINES (2 - Td or Tdap) Next due on 1/26/2034 01/26/2024  Imm Admin: Tdap                      Completed or No Longer Recommended       HEPATITIS C SCREENING  Completed      01/26/2024  Hep C Virus Ab component of Hepatitis C Antibody              COVID-19 Vaccine (Series Information) Completed      10/04/2024  Imm Admin: COVID-19 (UNSPECIFIED)    10/31/2023  Imm Admin: COVID-19 (MODERNA) 12YRS+ (SPIKEVAX)    01/06/2023  Imm Admin: COVID-19 (PFIZER) BIVALENT 12+YRS    06/03/2022  Imm Admin: COVID-19 (MODERNA) Monovalent Original Booster    11/19/2021  Imm Admin: COVID-19 (MODERNA) 1st,2nd,3rd Dose Monovalent     Only the first 5 history entries have been loaded, but more history exists.            ZOSTER VACCINE (Series Information) Completed      04/11/2025  Imm Admin: Shingrix    01/26/2024  Postponed until 1/26/2025 by Theresa Parra MD (Pending event)    01/26/2024  Imm Admin: Shingrix                               Objective   Vitals:    04/11/25 1106   BP: 126/76   Pulse: 63   SpO2: 97%   Weight: 71.7 kg (158 lb)   Height: 165.1 cm (65\")        BMI is >= 25 and <30. (Overweight) The following options were offered after discussion;: exercise counseling/recommendations and nutrition counseling/recommendations       Physical Exam  Vitals reviewed.   Constitutional:       General: She is not in acute distress.     Appearance: Normal appearance.   HENT:      Head: Normocephalic and atraumatic.      Right Ear: Tympanic membrane, ear canal and external ear normal.      Left Ear: Tympanic membrane, ear canal and external ear normal.      Nose: Nose normal. No rhinorrhea.      Mouth/Throat:      Mouth: Mucous membranes are moist.      Pharynx: No posterior oropharyngeal erythema.   Eyes:      Extraocular Movements: Extraocular movements intact.      " Conjunctiva/sclera: Conjunctivae normal.      Pupils: Pupils are equal, round, and reactive to light.   Neck:      Comments: No thyromegaly or thyroid nodule on palpation  Cardiovascular:      Rate and Rhythm: Normal rate and regular rhythm.      Pulses: Normal pulses.      Heart sounds: Normal heart sounds. No murmur heard.  Pulmonary:      Effort: Pulmonary effort is normal.      Breath sounds: Normal breath sounds. No wheezing.   Abdominal:      General: Bowel sounds are normal. There is no distension.      Palpations: Abdomen is soft.      Tenderness: There is no abdominal tenderness.   Musculoskeletal:         General: No tenderness or deformity. Normal range of motion.      Cervical back: Normal range of motion and neck supple.   Lymphadenopathy:      Cervical: No cervical adenopathy.   Skin:     General: Skin is warm and dry.      Capillary Refill: Capillary refill takes less than 2 seconds.      Findings: No lesion or rash.   Neurological:      General: No focal deficit present.      Mental Status: She is alert and oriented to person, place, and time.      Gait: Gait normal.      Deep Tendon Reflexes: Reflexes normal.   Psychiatric:         Mood and Affect: Mood normal.         Behavior: Behavior normal.         Judgment: Judgment normal.          PHQ-9 Depression Screening  Little interest or pleasure in doing things? Not at all   Feeling down, depressed, or hopeless? Not at all   PHQ-2 Total Score 0   Trouble falling or staying asleep, or sleeping too much?     Feeling tired or having little energy?     Poor appetite or overeating?     Feeling bad about yourself - or that you are a failure or have let yourself or your family down?     Trouble concentrating on things, such as reading the newspaper or watching television?     Moving or speaking so slowly that other people could have noticed? Or the opposite - being so fidgety or restless that you have been moving around a lot more than usual?     Thoughts  that you would be better off dead, or of hurting yourself in some way?     PHQ-9 Total Score     If you checked off any problems, how difficult have these problems made it for you to do your work, take care of things at home, or get along with other people?          The following data was reviewed by: Theresa Parra MD on 04/11/2025:  CMP          9/10/2024    10:36   CMP   Total Protein 6.7    Albumin 4.4    Total Bilirubin 0.4    Alkaline Phosphatase 74    AST (SGOT) 38    ALT (SGPT) 30        Lipid Panel          9/10/2024    10:36   Lipid Panel   Total Cholesterol 250    Triglycerides 68    HDL Cholesterol 61    VLDL Cholesterol 11    LDL Cholesterol  178        A1C Last 3 Results          9/10/2024    10:36   HGBA1C Last 3 Results   Hemoglobin A1C 5.70      Radiologic studies 2024 mammogram    Assessment and Plan  Twila Elam is a 55 y.o. female presents to [facility] today for annual exam and to discuss health goals/concerns, chart reviewed and updated, medications reviewed, labs reviewed, preventative med reviewed. Answered all questions at this time, pt expressed no further concerns today.     Preventative medicine:   Eye exam: Up to date.    Dental exam: Up to date.    BP: normal  Lipid Panel :   ordered    A1c: ordered   Hep C screening: Negative  Colon cancer screening UTD- age 45-75: Yes due 2027  Lung cancer screening UTD- age 50-75: N/A  Immunizations UTD: No patient declined pneumonia vaccine  Anxiety/depression screening: normal  Tobacco cessation counseling: N/A    Women:   Pap age 21-65: Up to date.  2023, next due 2027  Mammogram age 40-75: Up to date.  Scheduled June 2025  Osteoporosis Screen age 65:  N/A      Assessment & Plan  1. Annual examination.  Her blood pressure readings are within the normal range. She is scheduled for her mammogram in June 2025. She is advised to maintain a healthy lifestyle, including reducing processed food intake, increasing sugar  consumption, ensuring adequate hydration, using sunscreen, avoiding smoking and alcohol, getting sufficient sleep, engaging in regular physical activity, and incorporating meditation into her routine. A comprehensive set of laboratory tests will be ordered.      2. History of elevated cholesterol and A1c.  She has a history of elevated cholesterol and A1c levels, indicating prediabetes. These levels will be rechecked to assess her current status and determine if any adjustments in her management plan are needed.      Diagnoses and all orders for this visit:    1. Moderate mixed hyperlipidemia not requiring statin therapy (Primary)  -     CBC & Differential  -     Comprehensive Metabolic Panel  -     Lipid Panel    2. Prediabetes  -     CBC & Differential  -     Comprehensive Metabolic Panel  -     Hemoglobin A1c    3. Annual physical exam    4. Encounter for immunization  -     Shingrix Vaccine    Other orders  -     Cancel: Mammo Screening Digital Tomosynthesis Bilateral With CAD; Future        Patient voiced understanding and agreement with plan of care and had no further questions or concerns at this time.     Theresa Parra MD  Family Medicine  [facility]      Follow Up  Return in about 1 year (around 4/11/2026) for Annual physical.    Patient Instructions   Today: Update screening labs and vaccinations.    Meds: None    Healthy lifestyle tips  - Reduce intake of processed food (shop perimeter of grocery store), especially white flour and sugar  - Increase intake of fruits/veggies  - Drink 32-64oz of water a day  - Use sunscreen spf 30 or higher when going outside, reapply every 2 hours  - Quit smoking if you smoke  - Drink no more than 2 alcoholic beverages/day if you are male, no more than 1 alcoholic beverage/day if you are female  - Get 6-8 hours of restful sleep at night- poor sleep quality has been linked to increased risk of cardiovascular disease  - Voluntary physical activity cumulative  120-150 minutes/week  - Stress management utilizing meditation and mindfulness ([employer], free kenia)  - Keep blood pressure < 140/90    Heart healthy diet from AHA: https://www.heart.org/en/healthy-living/healthy-eating/eat-smart/nutrition-basics/aha-diet-and-lifestyle-recommendations       Patient or patient representative verbalized consent for the use of Ambient Listening during the visit with  Theresa Parra MD for chart documentation. 4/11/2025  16:58 EDT

## 2025-04-11 NOTE — PATIENT INSTRUCTIONS
Today: Update screening labs and vaccinations.    Meds: None    Healthy lifestyle tips  - Reduce intake of processed food (shop perimeter of grocery store), especially white flour and sugar  - Increase intake of fruits/veggies  - Drink 32-64oz of water a day  - Use sunscreen spf 30 or higher when going outside, reapply every 2 hours  - Quit smoking if you smoke  - Drink no more than 2 alcoholic beverages/day if you are male, no more than 1 alcoholic beverage/day if you are female  - Get 6-8 hours of restful sleep at night- poor sleep quality has been linked to increased risk of cardiovascular disease  - Voluntary physical activity cumulative 120-150 minutes/week  - Stress management utilizing meditation and mindfulness ([employer], free kenia)  - Keep blood pressure < 140/90    Heart healthy diet from AHA: https://www.heart.org/en/healthy-living/healthy-eating/eat-smart/nutrition-basics/aha-diet-and-lifestyle-recommendations

## 2025-04-12 LAB
ALBUMIN SERPL-MCNC: 4.7 G/DL (ref 3.8–4.9)
ALP SERPL-CCNC: 73 IU/L (ref 44–121)
ALT SERPL-CCNC: 36 IU/L (ref 0–32)
AST SERPL-CCNC: 49 IU/L (ref 0–40)
BASOPHILS # BLD AUTO: 0.1 X10E3/UL (ref 0–0.2)
BASOPHILS NFR BLD AUTO: 1 %
BILIRUB SERPL-MCNC: 0.5 MG/DL (ref 0–1.2)
BUN SERPL-MCNC: 14 MG/DL (ref 6–24)
BUN/CREAT SERPL: 20 (ref 9–23)
CALCIUM SERPL-MCNC: 9.7 MG/DL (ref 8.7–10.2)
CHLORIDE SERPL-SCNC: 102 MMOL/L (ref 96–106)
CHOLEST SERPL-MCNC: 276 MG/DL (ref 100–199)
CO2 SERPL-SCNC: 24 MMOL/L (ref 20–29)
CREAT SERPL-MCNC: 0.71 MG/DL (ref 0.57–1)
EGFRCR SERPLBLD CKD-EPI 2021: 100 ML/MIN/1.73
EOSINOPHIL # BLD AUTO: 0.1 X10E3/UL (ref 0–0.4)
EOSINOPHIL NFR BLD AUTO: 2 %
ERYTHROCYTE [DISTWIDTH] IN BLOOD BY AUTOMATED COUNT: 12.8 % (ref 11.7–15.4)
GLOBULIN SER CALC-MCNC: 2.4 G/DL (ref 1.5–4.5)
GLUCOSE SERPL-MCNC: 90 MG/DL (ref 70–99)
HBA1C MFR BLD: 6 % (ref 4.8–5.6)
HCT VFR BLD AUTO: 43.3 % (ref 34–46.6)
HDLC SERPL-MCNC: 67 MG/DL
HGB BLD-MCNC: 14 G/DL (ref 11.1–15.9)
IMM GRANULOCYTES # BLD AUTO: 0 X10E3/UL (ref 0–0.1)
IMM GRANULOCYTES NFR BLD AUTO: 0 %
LDL CALC COMMENT:: ABNORMAL
LDLC SERPL CALC-MCNC: 198 MG/DL (ref 0–99)
LYMPHOCYTES # BLD AUTO: 1.3 X10E3/UL (ref 0.7–3.1)
LYMPHOCYTES NFR BLD AUTO: 22 %
MCH RBC QN AUTO: 29.4 PG (ref 26.6–33)
MCHC RBC AUTO-ENTMCNC: 32.3 G/DL (ref 31.5–35.7)
MCV RBC AUTO: 91 FL (ref 79–97)
MONOCYTES # BLD AUTO: 0.4 X10E3/UL (ref 0.1–0.9)
MONOCYTES NFR BLD AUTO: 7 %
NEUTROPHILS # BLD AUTO: 4 X10E3/UL (ref 1.4–7)
NEUTROPHILS NFR BLD AUTO: 68 %
PLATELET # BLD AUTO: 307 X10E3/UL (ref 150–450)
POTASSIUM SERPL-SCNC: 4.4 MMOL/L (ref 3.5–5.2)
PROT SERPL-MCNC: 7.1 G/DL (ref 6–8.5)
RBC # BLD AUTO: 4.77 X10E6/UL (ref 3.77–5.28)
SODIUM SERPL-SCNC: 140 MMOL/L (ref 134–144)
TRIGL SERPL-MCNC: 71 MG/DL (ref 0–149)
VLDLC SERPL CALC-MCNC: 11 MG/DL (ref 5–40)
WBC # BLD AUTO: 5.9 X10E3/UL (ref 3.4–10.8)

## 2025-04-24 ENCOUNTER — RESULTS FOLLOW-UP (OUTPATIENT)
Dept: FAMILY MEDICINE CLINIC | Facility: CLINIC | Age: 55
End: 2025-04-24
Payer: COMMERCIAL

## 2025-04-24 ENCOUNTER — HOSPITAL ENCOUNTER (OUTPATIENT)
Dept: ULTRASOUND IMAGING | Facility: HOSPITAL | Age: 55
Discharge: HOME | End: 2025-04-24
Admitting: FAMILY MEDICINE
Payer: COMMERCIAL

## 2025-04-24 DIAGNOSIS — R73.03 PREDIABETES: Primary | ICD-10-CM

## 2025-04-24 DIAGNOSIS — E66.3 OVERWEIGHT (BMI 25.0-29.9): ICD-10-CM

## 2025-04-24 DIAGNOSIS — E78.2 MODERATE MIXED HYPERLIPIDEMIA NOT REQUIRING STATIN THERAPY: ICD-10-CM

## 2025-04-24 DIAGNOSIS — R79.89 ABNORMAL LFTS: ICD-10-CM

## 2025-04-24 PROCEDURE — 76705 ECHO EXAM OF ABDOMEN: CPT

## 2025-04-24 NOTE — PROGRESS NOTES
Hello!    I have reviewed your ultrasound liver. There is a benign appearing liver cyst, because it is less than 4cm no further evaluation is needed. The gallbladder does have some sludge or polyps in it, but there is no sign of blockage. So right now, it looks like genetics may be the cause of your abnormal labs. Let's repeat them in 3 months (July-ish) and see how things are trending and can decided if meds are indicated then. Please schedule lab only visit in clinic, labs ordered.       If you have any questions, please let us know.   Thank you!  Dr. Parra

## 2025-07-11 ENCOUNTER — PATIENT MESSAGE (OUTPATIENT)
Dept: FAMILY MEDICINE CLINIC | Facility: CLINIC | Age: 55
End: 2025-07-11
Payer: COMMERCIAL

## (undated) DEVICE — LN SMPL CO2 SHTRM SD STREAM W/M LUER

## (undated) DEVICE — CANN O2 ETCO2 FITS ALL CONN CO2 SMPL A/ 7IN DISP LF

## (undated) DEVICE — TUBING, SUCTION, 1/4" X 10', STRAIGHT: Brand: MEDLINE

## (undated) DEVICE — SINGLE-USE BIOPSY FORCEPS: Brand: RADIAL JAW 4

## (undated) DEVICE — KT ORCA ORCAPOD DISP STRL

## (undated) DEVICE — ADAPT CLN BIOGUARD AIR/H2O DISP

## (undated) DEVICE — SENSR O2 OXIMAX FNGR A/ 18IN NONSTR